# Patient Record
Sex: FEMALE | Race: WHITE | NOT HISPANIC OR LATINO | URBAN - METROPOLITAN AREA
[De-identification: names, ages, dates, MRNs, and addresses within clinical notes are randomized per-mention and may not be internally consistent; named-entity substitution may affect disease eponyms.]

---

## 2023-04-10 LAB
ALBUMIN (G/DL) IN SER/PLAS: 3.7 G/DL (ref 3.4–5)
ANION GAP IN SER/PLAS: 9 MMOL/L (ref 10–20)
CALCIUM (MG/DL) IN SER/PLAS: 8.1 MG/DL (ref 8.6–10.6)
CARBON DIOXIDE, TOTAL (MMOL/L) IN SER/PLAS: 28 MMOL/L (ref 21–32)
CHLORIDE (MMOL/L) IN SER/PLAS: 107 MMOL/L (ref 98–107)
CREATININE (MG/DL) IN SER/PLAS: 0.65 MG/DL (ref 0.5–1.05)
GFR FEMALE: >90 ML/MIN/1.73M2
GLUCOSE (MG/DL) IN SER/PLAS: 93 MG/DL (ref 74–99)
PHOSPHATE (MG/DL) IN SER/PLAS: 3.9 MG/DL (ref 2.5–4.9)
POTASSIUM (MMOL/L) IN SER/PLAS: 4.6 MMOL/L (ref 3.5–5.3)
SODIUM (MMOL/L) IN SER/PLAS: 139 MMOL/L (ref 136–145)
UREA NITROGEN (MG/DL) IN SER/PLAS: 14 MG/DL (ref 6–23)

## 2023-12-18 ENCOUNTER — TELEMEDICINE (OUTPATIENT)
Dept: PRIMARY CARE | Facility: CLINIC | Age: 43
End: 2023-12-18
Payer: COMMERCIAL

## 2023-12-18 DIAGNOSIS — U07.1 COVID-19: Primary | ICD-10-CM

## 2023-12-18 PROCEDURE — 99213 OFFICE O/P EST LOW 20 MIN: CPT | Performed by: FAMILY MEDICINE

## 2023-12-18 RX ORDER — NIRMATRELVIR AND RITONAVIR 300-100 MG
3 KIT ORAL 2 TIMES DAILY
Qty: 30 TABLET | Refills: 0 | Status: SHIPPED | OUTPATIENT
Start: 2023-12-18 | End: 2023-12-23

## 2023-12-18 RX ORDER — LEVOTHYROXINE SODIUM 175 UG/1
175 TABLET ORAL DAILY
COMMUNITY

## 2023-12-19 NOTE — PROGRESS NOTES
Subjective   Patient ID: Barby Coronel is a 43 y.o. female who presents for Covid    HPI   44 [facility] female [facility] with COVID-19.  Her symptoms began Friday evening with vomiting, fatigue, general body aches, sinus congestion, sneezing, sore throat, cough.  Has had fevers and chills.  Temp up to 103.  Took a COVID test on Saturday which was negative.  Took another test today which was positive.  Does have a history of asthma and has albuterol inhaler to use as needed.  Denies any chest pains or shortness of breath.  Has had several COVID vaccines.    Review of Systems  ROS as stated in HPI    Objective   There were no vitals taken for this visit.    Physical Exam  Virtual visit so no physical exam was performed  Pt appears A&O, NAD; no respiratory distress.  psych: mood and affect normal    Assessment/Plan   Problem List Items Addressed This Visit    None  Visit Diagnoses         Codes    COVID-19    -  Primary U07.1    Relevant Medications    nirmatrelvir-ritonavir (Paxlovid) 300 mg (150 mg x 2)-100 mg tablet therapy pack          Supportive treatment.  Rx antiviral  Isolation per CDC guidelines.  Isolate for 5 days since symptom onset.  If symptoms improving okay to leave isolation but wear a mask for an additional 5 days.  Follow-up if symptoms persist or worsen.  Go to the ER if any chest pains or shortness of breath.    This visit was completed via VIRTUAL visit. All issues as above were discussed and addressed but no physical exam or vital signs were performed. If it was felt that the patient should be evaluated in clinic then they were directed there. The patient verbally consented to visit.    Spent 5 minutes with pt face to face and more than 50% of this time was spent in counseling and coordination of care.

## 2024-07-02 ENCOUNTER — LAB REQUISITION (OUTPATIENT)
Dept: LAB | Facility: HOSPITAL | Age: 44
End: 2024-07-02
Payer: COMMERCIAL

## 2024-07-02 DIAGNOSIS — J02.9 ACUTE PHARYNGITIS, UNSPECIFIED: ICD-10-CM

## 2024-07-02 PROCEDURE — 87651 STREP A DNA AMP PROBE: CPT

## 2024-07-04 ENCOUNTER — TELEMEDICINE (OUTPATIENT)
Dept: PRIMARY CARE | Facility: CLINIC | Age: 44
End: 2024-07-04
Payer: COMMERCIAL

## 2024-07-04 DIAGNOSIS — G43.009 MIGRAINE WITHOUT AURA AND WITHOUT STATUS MIGRAINOSUS, NOT INTRACTABLE: Primary | ICD-10-CM

## 2024-07-04 LAB — S PYO DNA THROAT QL NAA+PROBE: NOT DETECTED

## 2024-07-04 PROCEDURE — 99213 OFFICE O/P EST LOW 20 MIN: CPT | Performed by: NURSE PRACTITIONER

## 2024-07-04 RX ORDER — SUMATRIPTAN 50 MG/1
50 TABLET, FILM COATED ORAL ONCE AS NEEDED
Qty: 9 TABLET | Refills: 11 | Status: SHIPPED | OUTPATIENT
Start: 2024-07-04 | End: 2025-07-04

## 2024-07-04 RX ORDER — KETOROLAC TROMETHAMINE 10 MG/1
10 TABLET, FILM COATED ORAL EVERY 6 HOURS PRN
Qty: 20 TABLET | Refills: 0 | Status: SHIPPED | OUTPATIENT
Start: 2024-07-04 | End: 2024-07-09

## 2024-07-04 ASSESSMENT — ENCOUNTER SYMPTOMS: HEADACHES: 1

## 2024-07-04 NOTE — PROGRESS NOTES
Subjective   Patient ID: Barby Coronel is a 44 y.o. female who presents for Headache.  Typical migraine with aura yesterday started  Aura is typically fatigue  Tylenol and ibuprofen not relieving it  No additional symptoms  No meds on hand, has used triptans in past.  No PCP visit recently.  No other changes    Headache         Review of Systems   Neurological:  Positive for headaches.       Objective   Physical Exam  Constitutional:       General: She is not in acute distress.     Appearance: She is not toxic-appearing.   Eyes:      Extraocular Movements: Extraocular movements intact.      Comments: No photosensitivity evident   Musculoskeletal:      Cervical back: Normal range of motion.   Neurological:      Mental Status: She is oriented to person, place, and time.      Comments: No discernible deficit         Assessment/Plan   Diagnoses and all orders for this visit:  Migraine without aura and without status migrainosus, not intractable  -     SUMAtriptan (Imitrex) 50 mg tablet; Take 1 tablet (50 mg) by mouth 1 time if needed for migraine. May repeat after 2 hours.  -     ketorolac (Toradol) 10 mg tablet; Take 1 tablet (10 mg) by mouth every 6 hours if needed for moderate pain (4 - 6) for up to 5 days. No other NSAIDs, Tylenol OK           SHAILESH Ruby-CNP 07/04/24 11:27 AM

## 2024-07-11 PROBLEM — G43.009 MIGRAINE WITHOUT AURA AND WITHOUT STATUS MIGRAINOSUS, NOT INTRACTABLE: Status: ACTIVE | Noted: 2024-07-11

## 2024-07-11 NOTE — ASSESSMENT & PLAN NOTE
Usual pattern reported, will refill triptan, add PRN.  Reviewed expected or typical course versus symptoms to report - ED for any sudden or acute changes.  Recommend PCP visit for CPE and further refills.

## 2024-08-03 DIAGNOSIS — E03.9 HYPOTHYROIDISM, UNSPECIFIED: ICD-10-CM

## 2024-08-03 RX ORDER — LEVOTHYROXINE SODIUM 175 UG/1
175 TABLET ORAL DAILY
Qty: 90 TABLET | Refills: 1 | Status: SHIPPED | OUTPATIENT
Start: 2024-08-03

## 2024-10-26 ENCOUNTER — OFFICE VISIT (OUTPATIENT)
Dept: URGENT CARE | Age: 44
End: 2024-10-26
Payer: COMMERCIAL

## 2024-10-26 VITALS
DIASTOLIC BLOOD PRESSURE: 81 MMHG | BODY MASS INDEX: 25.83 KG/M2 | WEIGHT: 155 LBS | TEMPERATURE: 98.4 F | SYSTOLIC BLOOD PRESSURE: 114 MMHG | HEIGHT: 65 IN | OXYGEN SATURATION: 99 % | HEART RATE: 70 BPM

## 2024-10-26 DIAGNOSIS — J01.90 ACUTE NON-RECURRENT SINUSITIS, UNSPECIFIED LOCATION: Primary | ICD-10-CM

## 2024-10-26 DIAGNOSIS — J45.20 MILD INTERMITTENT ASTHMA, UNSPECIFIED WHETHER COMPLICATED (HHS-HCC): ICD-10-CM

## 2024-10-26 DIAGNOSIS — J40 BRONCHITIS: ICD-10-CM

## 2024-10-26 RX ORDER — ALBUTEROL SULFATE 90 UG/1
2 INHALANT RESPIRATORY (INHALATION) EVERY 6 HOURS PRN
Qty: 18 G | Refills: 0 | Status: SHIPPED | OUTPATIENT
Start: 2024-10-26 | End: 2025-10-26

## 2024-10-26 RX ORDER — DOXYCYCLINE 100 MG/1
100 CAPSULE ORAL 2 TIMES DAILY
Qty: 14 CAPSULE | Refills: 0 | Status: SHIPPED | OUTPATIENT
Start: 2024-10-26 | End: 2024-11-02

## 2024-10-26 NOTE — PROGRESS NOTES
Subjective   Patient ID: Barby Coronel is a 44 y.o. female. They present today with a chief complaint of Cough, Nasal Congestion, Fever, and Sinus Problem (C/o productive cough x 2 weeks with fever and vomitting.  C/o PND and green congestion).    History of Present Illness  HPI  Presents with 2 weeks of sinus congestion, facial pain, PND, cough and wheezing. Fever earlier in the week. No CP, SOB. H/o mild asthma. Non remitting course.     Past Medical History  Allergies as of 10/26/2024 - Reviewed 10/26/2024   Allergen Reaction Noted    Hydrocodone Unknown 2018       (Not in a hospital admission)             Past Medical History:   Diagnosis Date    Acute vaginitis 2014    Vaginal infection    Anemia complicating pregnancy, unspecified trimester ([facility]) 2014    Anemia in pregnancy    Other conditions influencing health status     History of pregnancy    Personal history of gestational diabetes 10/30/2014    History of gestational diabetes    Personal history of other complications of pregnancy, childbirth and the puerperium     History of premature separation of placenta    Personal history of other complications of pregnancy, childbirth and the puerperium 2014    History of placenta previa    Personal history of other endocrine, nutritional and metabolic disease     History of obesity    Personal history of other specified conditions     History of abnormal Pap smear    Personal history of other specified conditions     History of headache    Personal history of other specified conditions 2019    History of diarrhea    Severe pre-eclampsia, unspecified trimester ([facility]) 10/15/2014    Severe preeclampsia    Unspecified asthma with (acute) exacerbation ([facility]) 2018    Asthma exacerbation       Past Surgical History:   Procedure Laterality Date    APPENDECTOMY  04/15/2014    Appendectomy     SECTION, LOW TRANSVERSE  10/30/2014     Section Low Transverse     "OTHER SURGICAL HISTORY  03/22/2017    Parathyroid Autotransplantation    TOTAL THYROIDECTOMY  03/22/2017    Thyroid Surgery Total Thyroidectomy    US GUIDED THYROID BIOPSY  1/24/2017    US GUIDED THYROID BIOPSY 1/24/2017 [facility] ANCILLARY LEGACY            Review of Systems  Review of Systems   Review of systems: A complete review of systems was done, and is as stated in the history of present illness, is otherwise negative or not pertinent to the complaint.       Objective    Vitals:    10/26/24 0843   BP: 114/81   BP Location: Left arm   Patient Position: Sitting   BP Cuff Size: Adult   Pulse: 70   Temp: 36.9 °C (98.4 °F)   TempSrc: Oral   SpO2: 99%   Weight: 70.3 kg (155 lb)   Height: 1.651 m (5' 5\")     No LMP recorded.    Physical Exam  NAD. Well appearing    MMM   PERRLA   no icterus   TMs clear bilat   Oropharynx clear of exudate or tonsillar swelling/exudate   No focal sinus ttp   neck supple. RITO. No LAD   no resp distress. Lungs CTAB without w/r/r   RRR. No m/r/g   Abd; Soft. NTTP   MENDEZ x 4. MS 5/5   Skin; warm without rashes   pulses 2+ throughout   neuro intact with normal sensation and motor   psych a&o x 3       Procedures    Point of Care Test & Imaging Results from this visit  Results for orders placed or performed in visit on 07/02/24   Group A Streptococcus, PCR    Collection Time: 07/02/24  3:26 PM    Specimen: Throat/Pharynx; Swab   Result Value Ref Range    Group A Strep PCR Not Detected Not Detected       Assessment/Plan   Allergies, medications, history, and pertinent labs/EKGs/Imaging reviewed by Agustín Oneil PA-C.     Medical Decision Making  -cw probable ABSI  -abx and decongestant  -no signs of PNA on exam  -fu pcp 2-3 days if not improved  All ?s answered     Orders and Diagnoses  Diagnoses and all orders for this visit:  Acute non-recurrent sinusitis, unspecified location  -     doxycycline (Vibramycin) 100 mg capsule; Take 1 capsule (100 mg) by mouth 2 times a day for 7 days. Take with " at least 8 ounces (large glass) of water, do not lie down for 30 minutes after  Bronchitis  -     doxycycline (Vibramycin) 100 mg capsule; Take 1 capsule (100 mg) by mouth 2 times a day for 7 days. Take with at least 8 ounces (large glass) of water, do not lie down for 30 minutes after  -     albuterol 90 mcg/actuation inhaler; Inhale 2 puffs every 6 hours if needed for wheezing.  Mild intermittent asthma, unspecified whether complicated ([facility])  -     albuterol 90 mcg/actuation inhaler; Inhale 2 puffs every 6 hours if needed for wheezing.

## 2024-12-20 DIAGNOSIS — E83.51 HYPOCALCEMIA: ICD-10-CM

## 2024-12-20 DIAGNOSIS — E03.9 HYPOTHYROIDISM, UNSPECIFIED TYPE: Primary | ICD-10-CM

## 2024-12-20 DIAGNOSIS — E55.9 VITAMIN D DEFICIENCY: ICD-10-CM

## 2025-03-02 ENCOUNTER — OFFICE VISIT (OUTPATIENT)
Dept: URGENT CARE | Age: 45
End: 2025-03-02
Payer: COMMERCIAL

## 2025-03-02 VITALS
BODY MASS INDEX: 25.79 KG/M2 | SYSTOLIC BLOOD PRESSURE: 131 MMHG | WEIGHT: 155 LBS | OXYGEN SATURATION: 99 % | DIASTOLIC BLOOD PRESSURE: 82 MMHG | HEART RATE: 58 BPM | RESPIRATION RATE: 16 BRPM | TEMPERATURE: 98.9 F

## 2025-03-02 DIAGNOSIS — M54.2 CERVICALGIA: Primary | ICD-10-CM

## 2025-03-02 PROCEDURE — 99213 OFFICE O/P EST LOW 20 MIN: CPT | Performed by: NURSE PRACTITIONER

## 2025-03-02 RX ORDER — CYCLOBENZAPRINE HCL 10 MG
10 TABLET ORAL 3 TIMES DAILY
Qty: 21 TABLET | Refills: 0 | Status: SHIPPED | OUTPATIENT
Start: 2025-03-02 | End: 2025-03-09

## 2025-03-02 RX ORDER — NAPROXEN 500 MG/1
500 TABLET ORAL
Qty: 20 TABLET | Refills: 0 | Status: SHIPPED | OUTPATIENT
Start: 2025-03-02 | End: 2025-03-12

## 2025-03-02 RX ORDER — METHYLPREDNISOLONE 4 MG/1
TABLET ORAL
Qty: 21 TABLET | Refills: 0 | Status: SHIPPED | OUTPATIENT
Start: 2025-03-02 | End: 2025-03-08

## 2025-03-02 NOTE — PROGRESS NOTES
Subjective   Patient ID: Barby Coronel is a 44 y.o. female. They present today with a chief complaint of Back Pain (C/o base of cervical spine pain x 3 days and getting worse. No injury).    History of Present Illness  Patient presents with posterior neck pain. States it started approximately 2 days ago. Denies any injury. States it does hurt to touch and turn it certain ways. State she can turn her head but is not due to the pain. She can touch her chin to her chest with no difficulty. Denies headache. She does have some numbness that radiates down to her right shoulder.    Past Medical History  Allergies as of 03/02/2025 - Reviewed 03/02/2025   Allergen Reaction Noted    Hydrocodone Unknown 02/07/2018       (Not in a hospital admission)       Past Medical History:   Diagnosis Date    Acute vaginitis 09/12/2014    Vaginal infection    Anemia complicating pregnancy, unspecified trimester ([facility]) 08/04/2014    Anemia in pregnancy    Other conditions influencing health status     History of pregnancy    Personal history of gestational diabetes 10/30/2014    History of gestational diabetes    Personal history of other complications of pregnancy, childbirth and the puerperium     History of premature separation of placenta    Personal history of other complications of pregnancy, childbirth and the puerperium 05/22/2014    History of placenta previa    Personal history of other endocrine, nutritional and metabolic disease     History of obesity    Personal history of other specified conditions     History of abnormal Pap smear    Personal history of other specified conditions     History of headache    Personal history of other specified conditions 05/22/2019    History of diarrhea    Severe pre-eclampsia, unspecified trimester ([facility]) 10/15/2014    Severe preeclampsia    Unspecified asthma with (acute) exacerbation ([facility]) 09/19/2018    Asthma exacerbation       Past Surgical History:   Procedure Laterality Date     APPENDECTOMY  04/15/2014    Appendectomy     SECTION, LOW TRANSVERSE  10/30/2014     Section Low Transverse    OTHER SURGICAL HISTORY  2017    Parathyroid Autotransplantation    TOTAL THYROIDECTOMY  2017    Thyroid Surgery Total Thyroidectomy    US GUIDED THYROID BIOPSY  2017    US GUIDED THYROID BIOPSY 2017 [facility] ANCILLARY LEGACY            Review of Systems  Review of Systems     See HPI                          Objective    Vitals:    25 1247   BP: 131/82   Pulse: 58   Resp: 16   Temp: 37.2 °C (98.9 °F)   TempSrc: Oral   SpO2: 99%   Weight: 70.3 kg (155 lb)     No LMP recorded.    Physical Exam  Neck:      Trachea: Trachea normal.      Meningeal: Brudzinski's sign and Kernig's sign absent.   Musculoskeletal:      Cervical back: No edema, erythema or crepitus. Pain with movement and muscular tenderness present. Decreased range of motion.   Lymphadenopathy:      Cervical: No cervical adenopathy.       Constitutional:       Appearance: Normal appearance.   HENT:      Head: Normocephalic and atraumatic.   Cardiovascular:      Rate and Rhythm: Normal rate and regular rhythm.      Pulses: Normal pulses.      Heart sounds: Normal heart sounds.   Pulmonary:      Effort: Pulmonary effort is normal.      Breath sounds: Normal breath sounds.   Neurological:      Mental Status: Patient is alert.     Procedures    Point of Care Test & Imaging Results from this visit  No results found for this visit on 25.   No results found.    Diagnostic study results (if any) were reviewed by [facility].    Assessment/Plan   Allergies, medications, history, and pertinent labs/EKGs/Imaging reviewed by SHAILESH Chiu-CNP.     Medical Decision Making  At time of discharge patient was clinically well-appearing and HDS for outpatient management. The patient and/or family was educated regarding diagnosis, supportive care, OTC and Rx medications. The patient and/or family was given the  opportunity to ask questions prior to discharge.  They verbalized understanding of my discussion of the plans for treatment, expected course, indications to return to  or seek further evaluation in ED, and the need for timely follow up as directed.   They were provided with a work/school excuse if requested.      Orders and Diagnoses  There are no diagnoses linked to this encounter.    Medical Admin Record      Patient disposition: Home    Electronically signed by [facility]  12:53 PM

## 2025-04-29 ENCOUNTER — APPOINTMENT (OUTPATIENT)
Dept: ENDOCRINOLOGY | Facility: CLINIC | Age: 45
End: 2025-04-29
Payer: COMMERCIAL

## 2025-04-29 VITALS
RESPIRATION RATE: 16 BRPM | HEIGHT: 65 IN | WEIGHT: 161.8 LBS | SYSTOLIC BLOOD PRESSURE: 120 MMHG | HEART RATE: 76 BPM | DIASTOLIC BLOOD PRESSURE: 70 MMHG | BODY MASS INDEX: 26.96 KG/M2

## 2025-04-29 DIAGNOSIS — E03.9 HYPOTHYROIDISM, UNSPECIFIED: ICD-10-CM

## 2025-04-29 DIAGNOSIS — E83.51 HYPOCALCEMIA: ICD-10-CM

## 2025-04-29 DIAGNOSIS — E89.0 POSTOPERATIVE HYPOTHYROIDISM: Primary | ICD-10-CM

## 2025-04-29 DIAGNOSIS — E55.9 VITAMIN D DEFICIENCY: ICD-10-CM

## 2025-04-29 PROBLEM — E04.2 MULTINODULAR NON-TOXIC GOITER: Status: ACTIVE | Noted: 2025-04-29

## 2025-04-29 PROCEDURE — 99214 OFFICE O/P EST MOD 30 MIN: CPT | Performed by: INTERNAL MEDICINE

## 2025-04-29 PROCEDURE — 3008F BODY MASS INDEX DOCD: CPT | Performed by: INTERNAL MEDICINE

## 2025-04-29 PROCEDURE — 1036F TOBACCO NON-USER: CPT | Performed by: INTERNAL MEDICINE

## 2025-04-29 RX ORDER — LEVOTHYROXINE SODIUM 175 UG/1
175 TABLET ORAL DAILY
Qty: 90 TABLET | Refills: 3 | Status: SHIPPED | OUTPATIENT
Start: 2025-04-29

## 2025-04-29 ASSESSMENT — ENCOUNTER SYMPTOMS
DIARRHEA: 0
SHORTNESS OF BREATH: 0
HEADACHES: 0
VOMITING: 0
FATIGUE: 0
CHILLS: 0
FEVER: 0
PALPITATIONS: 0
NAUSEA: 0
COUGH: 0

## 2025-04-29 NOTE — PATIENT INSTRUCTIONS
Take thyroid med every day  Recheck labs in 6 wks  Follow up in one year  Be sure to schedule with pcp for physical

## 2025-04-29 NOTE — PROGRESS NOTES
Endocrinology: Follow up visit  Subjective   Patient ID: Barby Coronel is a 45 y.o. female who presents for Hypothyroidism, Abnormal Calcium, and Vitamin D Deficiency.    PCP: Caren Frye DO    HPI  Last seen 6/2023  Hx total tx for benign mng.  Also with recurrent hypocalcemia and d def  Since last visit was on track with medication but now off track again.  Definitely knows when not taking meds; feels better when taking it regularly  No sxs of hypocalcemia  Not currently on ca/d      Review of Systems   Constitutional:  Negative for chills, fatigue and fever.   Respiratory:  Negative for cough and shortness of breath.    Cardiovascular:  Negative for chest pain and palpitations.   Gastrointestinal:  Negative for diarrhea, nausea and vomiting.   Neurological:  Negative for headaches.       Problem List[1]     Home Meds:  Current Outpatient Medications   Medication Instructions    albuterol 90 mcg/actuation inhaler 2 puffs, inhalation, Every 6 hours PRN    cyclobenzaprine (FLEXERIL) 10 mg, oral, 3 times daily    levothyroxine (SYNTHROID, LEVOXYL) 175 mcg, oral, Daily    SUMAtriptan (IMITREX) 50 mg, oral, Once as needed, May repeat after 2 hours.        RX Allergies[2]     Objective   Vitals:    04/29/25 1513   BP: 120/70   Pulse: 76   Resp: 16      Vitals:    04/29/25 1513   Weight: 73.4 kg (161 lb 12.8 oz)      Body mass index is 26.92 kg/m².   Physical Exam  Constitutional:       Appearance: Normal appearance. She is overweight.   HENT:      Head: Normocephalic and atraumatic.   Neck:      Comments: No palpable thyroid gland  Cardiovascular:      Rate and Rhythm: Normal rate and regular rhythm.      Heart sounds: No murmur heard.     No gallop.   Pulmonary:      Effort: Pulmonary effort is normal.      Breath sounds: Normal breath sounds.   Abdominal:      Palpations: Abdomen is soft.      Comments: benign   Neurological:      General: No focal deficit present.      Mental Status: She is alert and oriented to  "person, place, and time.      Deep Tendon Reflexes: Reflexes are normal and symmetric.   Psychiatric:         Behavior: Behavior is cooperative.         Labs:  Lab Results   Component Value Date    HGBA1C 5.4 07/09/2019    TSH 7.56 (H) 04/05/2023    FREET4 0.99 03/04/2021      No results found for: \"PR1\", \"THYROIDPAB\", \"TSI\"     Assessment/Plan   Assessment & Plan  Postoperative hypothyroidism    Discussed importance of taking t4 regularly  Recheck labs in 6 wks    Hypocalcemia    Recheck on upcoming labs  Vitamin D deficiency    Recheck, likely needs to restart  Hypothyroidism, unspecified    Orders:    levothyroxine (Synthroid, Levoxyl) 175 mcg tablet; Take 1 tablet (175 mcg) by mouth once daily.        Electronically signed by:  Giovanna Garcia MD 04/29/25 3:14 PM                   [1]   Patient Active Problem List  Diagnosis    Migraine without aura and without status migrainosus, not intractable    Hypocalcemia    Hypothyroidism    Impaired fasting glucose    Multinodular non-toxic goiter    Vitamin D deficiency   [2]   Allergies  Allergen Reactions    Hydrocodone Unknown    Peanut Swelling     rash and stomach upset     "

## 2026-04-24 ENCOUNTER — APPOINTMENT (OUTPATIENT)
Facility: CLINIC | Age: 46
End: 2026-04-24
Payer: COMMERCIAL